# Patient Record
Sex: MALE | Race: WHITE | Employment: UNEMPLOYED | URBAN - METROPOLITAN AREA
[De-identification: names, ages, dates, MRNs, and addresses within clinical notes are randomized per-mention and may not be internally consistent; named-entity substitution may affect disease eponyms.]

---

## 2017-06-12 ENCOUNTER — HOSPITAL ENCOUNTER (EMERGENCY)
Facility: CLINIC | Age: 37
Discharge: HOME | End: 2017-06-12
Admitting: FAMILY MEDICINE
Payer: COMMERCIAL

## 2017-06-12 DIAGNOSIS — T15.02XA FOREIGN BODY OF LEFT CORNEA, INITIAL ENCOUNTER: ICD-10-CM

## 2017-06-12 PROCEDURE — 99284 EMERGENCY DEPT VISIT MOD MDM: CPT | Mod: Z6 | Performed by: FAMILY MEDICINE

## 2017-06-12 PROCEDURE — 99282 EMERGENCY DEPT VISIT SF MDM: CPT

## 2017-06-14 DIAGNOSIS — J30.2 SEASONAL ALLERGIC RHINITIS: ICD-10-CM

## 2017-06-14 RX ORDER — CETIRIZINE HYDROCHLORIDE 10 MG/1
10 TABLET ORAL DAILY
COMMUNITY
End: 2021-08-15

## 2017-06-14 RX ORDER — IBUPROFEN 800 MG/1
800 TABLET, FILM COATED ORAL ONCE
Status: ACTIVE | OUTPATIENT
Start: 2017-06-12

## 2017-06-14 RX ORDER — PROPARACAINE HYDROCHLORIDE 5 MG/ML
1 SOLUTION/ DROPS OPHTHALMIC ONCE
Status: ACTIVE | OUTPATIENT
Start: 2017-06-12

## 2017-06-14 NOTE — DOWNTIME EVENT NOTE
The EMR was down for 7 hours on 6/14/2017.    RI was responsible for completing the paper charting during this time period.     The following information was re-entered into the system by Jenn Resendiz: Height/weight, Home meds and MAR    The following information will remain in the paper chart: Remainder of  Chart. Unable to chart meds given, no CSN available, Ibuprofen given at 1250 at altecain given by MD. Jenn Resendiz  6/14/2017

## 2017-06-15 RX ORDER — LORATADINE 10 MG/1
TABLET ORAL
Qty: 90 TABLET | Refills: 1 | Status: SHIPPED | OUTPATIENT
Start: 2017-06-15 | End: 2021-08-15

## 2017-06-15 NOTE — TELEPHONE ENCOUNTER
Prescription approved per [facility] Refill Protocol.    Nikki Ty RN  [facility]

## 2017-06-15 NOTE — TELEPHONE ENCOUNTER
LORATADINE 10 MG TABLET        Last Written Prescription Date: 4/4/16  Last Fill Quantity: 90,  # refills: 2   Last Office Visit with [facility], [facility] or [facility] prescribing provider: 10/5/16

## 2017-07-20 ENCOUNTER — APPOINTMENT (OUTPATIENT)
Dept: CT IMAGING | Facility: CLINIC | Age: 37
End: 2017-07-20
Attending: EMERGENCY MEDICINE
Payer: COMMERCIAL

## 2017-07-20 ENCOUNTER — HOSPITAL ENCOUNTER (EMERGENCY)
Facility: CLINIC | Age: 37
Discharge: HOME | End: 2017-07-20
Attending: EMERGENCY MEDICINE | Admitting: EMERGENCY MEDICINE
Payer: COMMERCIAL

## 2017-07-20 VITALS
TEMPERATURE: 98.7 F | BODY MASS INDEX: 23.24 KG/M2 | WEIGHT: 166 LBS | OXYGEN SATURATION: 96 % | RESPIRATION RATE: 16 BRPM | HEART RATE: 86 BPM | SYSTOLIC BLOOD PRESSURE: 156 MMHG | HEIGHT: 71 IN | DIASTOLIC BLOOD PRESSURE: 95 MMHG
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
## 2017-07-20 DIAGNOSIS — R51.9 FACIAL PAIN: ICD-10-CM

## 2017-07-20 DIAGNOSIS — F07.81 POST CONCUSSIVE SYNDROME: ICD-10-CM

## 2017-07-20 DIAGNOSIS — S13.9XXA NECK SPRAIN, INITIAL ENCOUNTER: ICD-10-CM

## 2017-07-20 LAB
ALBUMIN SERPL-MCNC: 3.9 G/DL (ref 3.4–5)
ALP SERPL-CCNC: 58 U/L (ref 40–150)
ALT SERPL W P-5'-P-CCNC: 24 U/L (ref 0–70)
ANION GAP SERPL CALCULATED.3IONS-SCNC: 11 MMOL/L (ref 3–14)
AST SERPL W P-5'-P-CCNC: 10 U/L (ref 0–45)
BASOPHILS # BLD AUTO: 0 10E9/L (ref 0–0.2)
BASOPHILS NFR BLD AUTO: 0.1 %
BILIRUB SERPL-MCNC: 1.7 MG/DL (ref 0.2–1.3)
BUN SERPL-MCNC: 12 MG/DL (ref 7–30)
CALCIUM SERPL-MCNC: 8.9 MG/DL (ref 8.5–10.1)
CHLORIDE SERPL-SCNC: 103 MMOL/L (ref 94–109)
CO2 SERPL-SCNC: 28 MMOL/L (ref 20–32)
CREAT SERPL-MCNC: 0.74 MG/DL (ref 0.66–1.25)
DIFFERENTIAL METHOD BLD: NORMAL
EOSINOPHIL # BLD AUTO: 0 10E9/L (ref 0–0.7)
EOSINOPHIL NFR BLD AUTO: 0.1 %
ERYTHROCYTE [DISTWIDTH] IN BLOOD BY AUTOMATED COUNT: 12 % (ref 10–15)
GFR SERPL CREATININE-BSD FRML MDRD: ABNORMAL ML/MIN/1.7M2
GLUCOSE SERPL-MCNC: 93 MG/DL (ref 70–99)
HCT VFR BLD AUTO: 45.3 % (ref 40–53)
HGB BLD-MCNC: 15.8 G/DL (ref 13.3–17.7)
IMM GRANULOCYTES # BLD: 0 10E9/L (ref 0–0.4)
IMM GRANULOCYTES NFR BLD: 0.3 %
LIPASE SERPL-CCNC: 61 U/L (ref 73–393)
LYMPHOCYTES # BLD AUTO: 1 10E9/L (ref 0.8–5.3)
LYMPHOCYTES NFR BLD AUTO: 14.1 %
MCH RBC QN AUTO: 31.6 PG (ref 26.5–33)
MCHC RBC AUTO-ENTMCNC: 34.9 G/DL (ref 31.5–36.5)
MCV RBC AUTO: 91 FL (ref 78–100)
MONOCYTES # BLD AUTO: 1 10E9/L (ref 0–1.3)
MONOCYTES NFR BLD AUTO: 13.8 %
NEUTROPHILS # BLD AUTO: 5.1 10E9/L (ref 1.6–8.3)
NEUTROPHILS NFR BLD AUTO: 71.6 %
NRBC # BLD AUTO: 0 10*3/UL
NRBC BLD AUTO-RTO: 0 /100
PLATELET # BLD AUTO: 161 10E9/L (ref 150–450)
POTASSIUM SERPL-SCNC: 3.5 MMOL/L (ref 3.4–5.3)
PROT SERPL-MCNC: 7.1 G/DL (ref 6.8–8.8)
RBC # BLD AUTO: 5 10E12/L (ref 4.4–5.9)
SODIUM SERPL-SCNC: 142 MMOL/L (ref 133–144)
WBC # BLD AUTO: 7.1 10E9/L (ref 4–11)

## 2017-07-20 PROCEDURE — 96361 HYDRATE IV INFUSION ADD-ON: CPT | Mod: 59

## 2017-07-20 PROCEDURE — 83690 ASSAY OF LIPASE: CPT | Performed by: EMERGENCY MEDICINE

## 2017-07-20 PROCEDURE — 99284 EMERGENCY DEPT VISIT MOD MDM: CPT | Mod: Z6 | Performed by: EMERGENCY MEDICINE

## 2017-07-20 PROCEDURE — 99284 EMERGENCY DEPT VISIT MOD MDM: CPT | Mod: 25

## 2017-07-20 PROCEDURE — 25000132 ZZH RX MED GY IP 250 OP 250 PS 637: Performed by: EMERGENCY MEDICINE

## 2017-07-20 PROCEDURE — 85025 COMPLETE CBC W/AUTO DIFF WBC: CPT | Performed by: EMERGENCY MEDICINE

## 2017-07-20 PROCEDURE — 70450 CT HEAD/BRAIN W/O DYE: CPT

## 2017-07-20 PROCEDURE — 80053 COMPREHEN METABOLIC PANEL: CPT | Performed by: EMERGENCY MEDICINE

## 2017-07-20 PROCEDURE — 25000128 H RX IP 250 OP 636: Performed by: EMERGENCY MEDICINE

## 2017-07-20 PROCEDURE — 96374 THER/PROPH/DIAG INJ IV PUSH: CPT

## 2017-07-20 RX ORDER — LIDOCAINE 50 MG/G
1 PATCH TOPICAL ONCE
Status: COMPLETED | OUTPATIENT
Start: 2017-07-20 | End: 2017-07-20

## 2017-07-20 RX ORDER — ONDANSETRON 4 MG/1
4 TABLET, ORALLY DISINTEGRATING ORAL EVERY 8 HOURS PRN
Qty: 10 TABLET | Refills: 0 | Status: SHIPPED | OUTPATIENT
Start: 2017-07-20 | End: 2017-07-23

## 2017-07-20 RX ORDER — HYDROCODONE BITARTRATE AND ACETAMINOPHEN 5; 325 MG/1; MG/1
1-2 TABLET ORAL EVERY 4 HOURS PRN
Qty: 15 TABLET | Refills: 0 | Status: SHIPPED | OUTPATIENT
Start: 2017-07-20 | End: 2017-07-21

## 2017-07-20 RX ORDER — OXYCODONE HYDROCHLORIDE 5 MG/1
5 TABLET ORAL ONCE
Status: COMPLETED | OUTPATIENT
Start: 2017-07-20 | End: 2017-07-20

## 2017-07-20 RX ORDER — LIDOCAINE 50 MG/G
OINTMENT TOPICAL 2 TIMES DAILY PRN
Qty: 30 G | Refills: 0 | Status: SHIPPED | OUTPATIENT
Start: 2017-07-20 | End: 2017-07-21

## 2017-07-20 RX ORDER — ONDANSETRON 2 MG/ML
4 INJECTION INTRAMUSCULAR; INTRAVENOUS ONCE
Status: COMPLETED | OUTPATIENT
Start: 2017-07-20 | End: 2017-07-20

## 2017-07-20 RX ORDER — OXYCODONE AND ACETAMINOPHEN 5; 325 MG/1; MG/1
2 TABLET ORAL EVERY 6 HOURS PRN
COMMUNITY
End: 2017-07-20

## 2017-07-20 RX ORDER — LIDOCAINE 40 MG/G
CREAM TOPICAL
Status: DISCONTINUED | OUTPATIENT
Start: 2017-07-20 | End: 2017-07-20 | Stop reason: HOSPADM

## 2017-07-20 RX ADMIN — SODIUM CHLORIDE, POTASSIUM CHLORIDE, SODIUM LACTATE AND CALCIUM CHLORIDE 1000 ML: 600; 310; 30; 20 INJECTION, SOLUTION INTRAVENOUS at 11:23

## 2017-07-20 RX ADMIN — LIDOCAINE 1 PATCH: 50 PATCH CUTANEOUS at 11:21

## 2017-07-20 RX ADMIN — OXYCODONE HYDROCHLORIDE 5 MG: 5 TABLET ORAL at 11:28

## 2017-07-20 RX ADMIN — ONDANSETRON 4 MG: 2 INJECTION INTRAMUSCULAR; INTRAVENOUS at 11:23

## 2017-07-20 RX ADMIN — LIDOCAINE 1 PATCH: 50 PATCH CUTANEOUS at 13:56

## 2017-07-20 NOTE — ED AVS SNAPSHOT
[facility], Emergency Department    [address]    [place] [address]    Phone:  [phone]    Fax:  [phone]                                       Porfirio Benz   MRN: [id number]    Department:  [facility], Emergency Department   Date of Visit:  7/20/2017           Patient Information     Date Of Birth          1980        Your diagnoses for this visit were:     Post concussive syndrome     Facial pain     Neck sprain, initial encounter        You were seen by Ana Real MD.        Discharge Instructions       Thank you for coming to the [facility] Emergency Department.     Please follow up with your primary care clinic in the next week.     Keep your scheduled appointment with ENT about your facial fractures and the [facility].     Use zofran for nausea.   If pain is severe, use Norco (hydrocodone-acetaminophen). Try Tylenol 1000mg every 4 hours for moderate pain. Please note that Norco contains acetaminophen (Tylenol).  Do not take more than 4000mg of acetaminophen from all sources in a 24 hour period.  Apply warm packs to sore areas.   Keep the lidocaine patch on for 12 hours, then remove it. After 12 hours without the patch, start applying lidocaine cream to painful areas of your neck and shoulders.       24 Hour Appointment Hotline       To make an appointment at any [facility] clinic, call [phone] ([phone]). If you don't have a family doctor or clinic, we will help you find one. [facility] clinics are conveniently located to serve the needs of you and your family.             Review of your medicines      START taking        Dose / Directions Last dose taken    HYDROcodone-acetaminophen 5-325 MG per tablet   Commonly known as:  NORCO   Dose:  1-2 tablet   Quantity:  15 tablet        Take 1-2 tablets by mouth every 4 hours as needed for moderate to severe pain   Refills:  0        lidocaine 5 %  ointment   Commonly known as:  XYLOCAINE   Quantity:  30 g        Apply topically 2 times daily as needed for moderate pain To neck and shoulders   Refills:  0        MAGIC MOUTHWASH (ENTER INGREDIENTS IN COMMENTS)   Dose:  5 mL   Quantity:  180 mL        Swish and spit 5 mLs in mouth every 6 hours as needed   Refills:  0        ondansetron 4 MG ODT tab   Commonly known as:  ZOFRAN ODT   Dose:  4 mg   Quantity:  10 tablet        Take 1 tablet (4 mg) by mouth every 8 hours as needed for nausea   Refills:  0          Our records show that you are taking the medicines listed below. If these are incorrect, please call your family doctor or clinic.        Dose / Directions Last dose taken    cetirizine 10 MG tablet   Commonly known as:  zyrTEC   Dose:  10 mg        Take 10 mg by mouth daily   Refills:  0        gabapentin 100 MG capsule   Commonly known as:  NEURONTIN   Dose:  100 mg   Quantity:  90 capsule        Take 1 capsule (100 mg) by mouth 3 times daily   Refills:  1        loratadine 10 MG tablet   Commonly known as:  CLARITIN   Quantity:  90 tablet        TAKE ONE TABLET BY MOUTH ONE TIME DAILY   Refills:  1          STOP taking        Dose Reason for stopping Comments    oxyCODONE-acetaminophen 5-325 MG per tablet   Commonly known as:  PERCOCET                      Prescriptions were sent or printed at these locations (4 Prescriptions)                   Other Prescriptions                Printed at Department/Unit printer (4 of 4)         ondansetron (ZOFRAN ODT) 4 MG ODT tab               HYDROcodone-acetaminophen (NORCO) 5-325 MG per tablet               lidocaine (XYLOCAINE) 5 % ointment               MAGIC MOUTHWASH, ENTER INGREDIENTS IN COMMENTS,                Procedures and tests performed during your visit     CBC with platelets differential    Comprehensive metabolic panel    Head CT w/o contrast    Lipase    Peripheral IV catheter      Orders Needing Specimen Collection     None      Pending Results      No orders found from 7/18/2017 to 7/21/2017.            Pending Culture Results     No orders found from 7/18/2017 to 7/21/2017.            Pending Results Instructions     If you had any lab results that were not finalized at the time of your Discharge, you can call the ED Lab Result RN at [phone]. You will be contacted by this team for any positive Lab results or changes in treatment. The nurses are available 7 days a week from 10A to 6:30P.  You can leave a message 24 hours per day and they will return your call.        Thank you for choosing [facility]       Thank you for choosing [facility] for your care. Our goal is always to provide you with excellent care. Hearing back from our patients is one way we can continue to improve our services. Please take a few minutes to complete the written survey that you may receive in the mail after you visit with us. Thank you!        [facility] Information     [employer] gives you secure access to your electronic health record. If you see a primary care provider, you can also send messages to your care team and make appointments. If you have questions, please call your primary care clinic.  If you do not have a primary care provider, please call [phone] and they will assist you.        Care EveryWhere ID     This is your Care EveryWhere ID. This could be used by other organizations to access your [facility] medical records  [id number]        Equal Access to Services     NICK JAY : Amanda Farnsworth, wasandrineda luqadaha, qaybta kaalmeet malone, eric parikh. So [facility] [phone].    ATENCIÓN: Si habla español, tiene a coles disposición servicios gratuitos de asistencia lingüística. Llame al [phone].    We comply with applicable federal civil rights laws and [place] laws. We do not discriminate on the basis of race, color, national origin, age, disability sex, sexual orientation or gender identity.            After Visit  Summary       This is your record. Keep this with you and show to your community pharmacist(s) and doctor(s) at your next visit.

## 2017-07-20 NOTE — ED AVS SNAPSHOT
[facility], Emergency Department    [address]    [facility] [address]    Phone:  [phone]    Fax:  [phone]                                       Porfirio Benz   MRN: [id number]    Department:  [facility], Emergency Department   Date of Visit:  7/20/2017           After Visit Summary Signature Page     I have received my discharge instructions, and my questions have been answered. I have discussed any challenges I see with this plan with the nurse or doctor.    ..........................................................................................................................................  Patient/Patient Representative Signature      ..........................................................................................................................................  Patient Representative Print Name and Relationship to Patient    ..................................................               ................................................  Date                                            Time    ..........................................................................................................................................  Reviewed by Signature/Title    ...................................................              ..............................................  Date                                                            Time

## 2017-07-20 NOTE — DISCHARGE INSTRUCTIONS
Thank you for coming to the [facility] Emergency Department.     Please follow up with your primary care clinic in the next week.     Keep your scheduled appointment with ENT about your facial fractures and the [facility].     Use zofran for nausea.   If pain is severe, use Norco (hydrocodone-acetaminophen). Try Tylenol 1000mg every 4 hours for moderate pain. Please note that Norco contains acetaminophen (Tylenol).  Do not take more than 4000mg of acetaminophen from all sources in a 24 hour period.  Apply warm packs to sore areas.   Keep the lidocaine patch on for 12 hours, then remove it. After 12 hours without the patch, start applying lidocaine cream to painful areas of your neck and shoulders.
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
## 2017-07-21 ENCOUNTER — OFFICE VISIT (OUTPATIENT)
Dept: FAMILY MEDICINE | Facility: CLINIC | Age: 37
End: 2017-07-21
Payer: COMMERCIAL

## 2017-07-21 ENCOUNTER — TELEPHONE (OUTPATIENT)
Dept: FAMILY MEDICINE | Facility: CLINIC | Age: 37
End: 2017-07-21

## 2017-07-21 VITALS
HEIGHT: 71 IN | SYSTOLIC BLOOD PRESSURE: 131 MMHG | HEART RATE: 62 BPM | OXYGEN SATURATION: 98 % | TEMPERATURE: 98 F | DIASTOLIC BLOOD PRESSURE: 88 MMHG

## 2017-07-21 DIAGNOSIS — M54.2 CERVICALGIA: ICD-10-CM

## 2017-07-21 DIAGNOSIS — Z87.828 HISTORY OF MOTOR VEHICLE ACCIDENT: Primary | ICD-10-CM

## 2017-07-21 DIAGNOSIS — S06.0X9D CONCUSSION, WITH LOC OF UNSPECIFIED DURATION, SUBSEQUENT ENCOUNTER: ICD-10-CM

## 2017-07-21 DIAGNOSIS — M54.6 ACUTE THORACIC BACK PAIN, UNSPECIFIED BACK PAIN LATERALITY: ICD-10-CM

## 2017-07-21 PROCEDURE — 99214 OFFICE O/P EST MOD 30 MIN: CPT | Performed by: FAMILY MEDICINE

## 2017-07-21 RX ORDER — ONDANSETRON 4 MG/1
4 TABLET, FILM COATED ORAL EVERY 8 HOURS PRN
Qty: 24 TABLET | Refills: 0 | Status: SHIPPED | OUTPATIENT
Start: 2017-07-21 | End: 2021-08-15

## 2017-07-21 RX ORDER — HYDROCODONE BITARTRATE AND ACETAMINOPHEN 5; 325 MG/1; MG/1
1 TABLET ORAL EVERY 4 HOURS PRN
Qty: 25 TABLET | Refills: 0 | Status: SHIPPED | OUTPATIENT
Start: 2017-07-21 | End: 2021-08-15

## 2017-07-21 RX ORDER — CYCLOBENZAPRINE HCL 10 MG
10 TABLET ORAL 3 TIMES DAILY
Qty: 42 TABLET | Refills: 0 | Status: SHIPPED | OUTPATIENT
Start: 2017-07-21 | End: 2017-08-04

## 2017-07-21 RX ORDER — LIDOCAINE 50 MG/G
OINTMENT TOPICAL 2 TIMES DAILY PRN
Qty: 250 G | Refills: 0 | Status: SHIPPED | OUTPATIENT
Start: 2017-07-21 | End: 2021-08-15

## 2017-07-21 ASSESSMENT — ENCOUNTER SYMPTOMS
HEADACHES: 1
PHOTOPHOBIA: 1
FACIAL SWELLING: 1
NAUSEA: 1
NECK PAIN: 1
FEVER: 0
RESPIRATORY NEGATIVE: 1
CHILLS: 0
VOMITING: 1
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
## 2017-07-21 NOTE — PATIENT INSTRUCTIONS
Keep ent apt   See concussion   zofran as needed for nausea  Push fluids  Refilled norco but decrease over time to non as can cause rebound headaches and make concussion worse  Increase ibuporfen to 600 mg every 6 hrs with food  Try to move around   Refilled lidocaine   Continue with being active  and modify activity that causes more pain   Ice then heat 20 min twice a day  Over the counter pain creams to area  Flexeril 10 mg three times a day # 42 no refills  Can make you tired, do not drive or operate machinery on this medication   Follow up with primary if symptoms persist  If get worse such that loose control of bladder , bowels or difficulty walking go to Urgent care/ ER
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
## 2017-07-21 NOTE — TELEPHONE ENCOUNTER
Spoke with wife gertrudis for 115p today, however pt insurance is [employer], called them back and let them know we do not take [employer] insurance,   is he using his [employer] insurance if so then we can see him    She will be calling back to clarify the insurance they plan on using    John Hadley RN

## 2017-07-21 NOTE — TELEPHONE ENCOUNTER
Call to patient after huddle with provider patel, patient was booked as a double book at [facility]. Patient was rescheduled to a open appointment [facility] at 1420      Nikki Ty RN  [facility]
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
## 2017-07-24 ENCOUNTER — TELEPHONE (OUTPATIENT)
Dept: FAMILY MEDICINE | Facility: CLINIC | Age: 37
End: 2017-07-24

## 2017-07-24 NOTE — TELEPHONE ENCOUNTER
----- Message from Allen Gallego ATC sent at 7/24/2017  1:00 PM CDT -----  Regarding: Concussion  Referral  Dr Arauz.    I Have contacted Porfirio, and he informed me that he is scheduled for a concussion appointment on Aug 2nd. He is unaware of who it is with as his wife made the appointment. As far as I can tell, it is not with [facility] or the [facility]. I informed him that if he feels he needs to seek further f/u, to contact the  or the [place] clinic. I would be able to get him an appointment at the [facility]'s concussion clinic the second week in August if needed.    Allen Gallego, LILIAN  [facility]   [phone]

## 2018-02-07 ENCOUNTER — TRANSFERRED RECORDS (OUTPATIENT)
Dept: HEALTH INFORMATION MANAGEMENT | Facility: CLINIC | Age: 38
End: 2018-02-07

## 2018-02-14 ENCOUNTER — TRANSFERRED RECORDS (OUTPATIENT)
Dept: HEALTH INFORMATION MANAGEMENT | Facility: CLINIC | Age: 38
End: 2018-02-14

## 2018-02-21 ENCOUNTER — TRANSFERRED RECORDS (OUTPATIENT)
Dept: HEALTH INFORMATION MANAGEMENT | Facility: CLINIC | Age: 38
End: 2018-02-21

## 2018-03-07 ENCOUNTER — TRANSFERRED RECORDS (OUTPATIENT)
Dept: HEALTH INFORMATION MANAGEMENT | Facility: CLINIC | Age: 38
End: 2018-03-07

## 2019-04-07 ENCOUNTER — HOSPITAL ENCOUNTER (EMERGENCY)
Facility: CLINIC | Age: 39
Discharge: HOME | End: 2019-04-07
Attending: EMERGENCY MEDICINE | Admitting: EMERGENCY MEDICINE
Payer: COMMERCIAL

## 2019-04-07 VITALS
OXYGEN SATURATION: 99 % | TEMPERATURE: 96.5 F | RESPIRATION RATE: 18 BRPM | DIASTOLIC BLOOD PRESSURE: 72 MMHG | BODY MASS INDEX: 26.5 KG/M2 | WEIGHT: 190 LBS | SYSTOLIC BLOOD PRESSURE: 115 MMHG

## 2019-04-07 DIAGNOSIS — S99.921A INJURY OF TOE, RIGHT, INITIAL ENCOUNTER: ICD-10-CM

## 2019-04-07 PROCEDURE — 25000132 ZZH RX MED GY IP 250 OP 250 PS 637: Performed by: EMERGENCY MEDICINE

## 2019-04-07 PROCEDURE — 99283 EMERGENCY DEPT VISIT LOW MDM: CPT | Performed by: EMERGENCY MEDICINE

## 2019-04-07 PROCEDURE — 99283 EMERGENCY DEPT VISIT LOW MDM: CPT | Mod: Z6 | Performed by: EMERGENCY MEDICINE

## 2019-04-07 RX ORDER — IBUPROFEN 600 MG/1
600 TABLET, FILM COATED ORAL ONCE
Status: COMPLETED | OUTPATIENT
Start: 2019-04-07 | End: 2019-04-07

## 2019-04-07 RX ADMIN — IBUPROFEN 600 MG: 600 TABLET ORAL at 18:37

## 2019-04-07 ASSESSMENT — ENCOUNTER SYMPTOMS
NUMBNESS: 0
WEAKNESS: 0
WOUND: 0

## 2019-04-07 NOTE — ED PROVIDER NOTES
"  History     Chief Complaint   Patient presents with     Toe Injury     Pt hit 4th and 5th toe on washing machine. C/O pain in those toes     The history is provided by the patient and medical records.     Porfirio Benz is a 38 year old male who who presents to the Emergency Department for evaluation of toe injury. Patient reports that he was chasing his 4 year old in the basement when he accidentally jammed his 4th and 5th toe of his right foot on the washing machine. After examining his 5th toe, noticed that he was able to bend it outward with minimal resistance. Wife insisted that he visit the ED. He has not taken pain medication.     Past Medical History:   Diagnosis Date     Asthma, mild intermittent     Ptr stated,\"I don't think I have Asthma, It from smoking\"     Bipolar 1 disorder (H)     dx in [facility], [place]-another doc rec        Past Surgical History:   Procedure Laterality Date     ORTHOPEDIC SURGERY      Ankle surgery to years ago.       Family History   Problem Relation Age of Onset     Diabetes Mother      Cancer Maternal Grandfather      Alzheimer Disease Paternal Grandmother        Social History     Tobacco Use     Smoking status: Former Smoker     Packs/day: 0.50     Types: Cigarettes     Last attempt to quit: 2017     Years since quittin.7     Smokeless tobacco: Never Used     Tobacco comment: Have not smoked in Three weeks. Thinking of quiting\"   Substance Use Topics     Alcohol use: Yes     Alcohol/week: 0.0 oz     Comment: \"Once in a great While\"       Current Facility-Administered Medications   Medication     ibuprofen (ADVIL/MOTRIN) tablet 600 mg     Current Outpatient Medications   Medication     cetirizine (ZYRTEC) 10 MG tablet     gabapentin (NEURONTIN) 100 MG capsule     HYDROcodone-acetaminophen (NORCO) 5-325 MG per tablet     lidocaine (XYLOCAINE) 5 % ointment     loratadine (CLARITIN) 10 MG tablet     MAGIC MOUTHWASH, ENTER INGREDIENTS IN COMMENTS, "     ondansetron (ZOFRAN) 4 MG tablet     Facility-Administered Medications Ordered in Other Encounters   Medication     ibuprofen (ADVIL/MOTRIN) tablet 800 mg     proparacaine (ALCAINE) 0.5 % ophthalmic solution 1 drop        Allergies   Allergen Reactions     Seasonal Allergies        I have reviewed the Medications, Allergies, Past Medical and Surgical History, and Social History in the Epic system.    Review of Systems   Musculoskeletal:        Positive for injury of 4th and 5th toe of right foot   Skin: Negative for wound.   Neurological: Negative for weakness and numbness.       Physical Exam   BP: 115/72  Heart Rate: 69  Temp: 96.5  F (35.8  C)  Resp: 18  Weight: 86.2 kg (190 lb)  SpO2: 99 %      Physical Exam   Constitutional: He is oriented to person, place, and time. He appears well-developed and well-nourished. No distress.   Musculoskeletal:   Swelling and pain with manipulation of the fifth toe on right foot.  No obvious deformity.   Neurological: He is alert and oriented to person, place, and time.   Sensation intact to distal fourth and fifth toe of right foot   Skin: Skin is warm and dry.       ED Course   6:25 PM  The patient was seen and examined by Gladis Sethi MD in Room ED20.        Procedures             Critical Care time:  none             Labs Ordered and Resulted from Time of ED Arrival Up to the Time of Departure from the ED - No data to display         Assessments & Plan (with Medical Decision Making)     Mr. Benz is a 38-year-old man who presented today after injuring his right foot on a dryer.  He has no other injuries.  His feet are affected neurovascularly intact with positive DP pulse and normal cap refill to the foot, no numbness to toe.  He does have swelling and tenderness to his right fifth toe.  Also has some mild tenderness to his fourth toe.  Will obtain x-ray, likely placed in orthopedic shoe with demetris taping.  Requested ibuprofen which will be provided.    Progress  note:  Patient abruptly walked out of the ED saying that he needed to leave.  Did not explain or allow us to give him and orthopedic shoe. Did receive ibuprofen.  Did not have xray.  Gladis Sethi MD on 4/7/2019 at 7:27 PM     I have reviewed the nursing notes.    I have reviewed the findings, diagnosis, plan and need for follow up with the patient.       Medication List      There are no discharge medications for this visit.         Final diagnoses:   None     I, Keiry Oscar, am serving as a trained medical scribe to document services personally performed by Gladis Sethi MD, based on the provider's statements to me.      IGladis MD, was physically present and have reviewed and verified the accuracy of this note documented by Keiry Oscar    4/7/2019   [facility], [place], EMERGENCY DEPARTMENT     Gladis Sethi MD  04/07/19 1928

## 2019-04-08 NOTE — ED NOTES
Pt was seen by MD, x-rays ordered, and Pt given Ibuprofen.  Per primary RN, Pt did not want to stay.

## 2019-04-08 NOTE — ED NOTES
Patient requesting to leave, MD seen patient discussed risks and consequences of not finishing treatment. AMA form signed by the patient.

## 2019-09-28 ENCOUNTER — HEALTH MAINTENANCE LETTER (OUTPATIENT)
Age: 39
End: 2019-09-28

## 2020-12-19 ENCOUNTER — HOSPITAL ENCOUNTER (EMERGENCY)
Facility: CLINIC | Age: 40
Discharge: HOME | End: 2020-12-19
Attending: STUDENT IN AN ORGANIZED HEALTH CARE EDUCATION/TRAINING PROGRAM | Admitting: STUDENT IN AN ORGANIZED HEALTH CARE EDUCATION/TRAINING PROGRAM
Payer: COMMERCIAL

## 2020-12-19 VITALS
SYSTOLIC BLOOD PRESSURE: 110 MMHG | DIASTOLIC BLOOD PRESSURE: 65 MMHG | HEART RATE: 78 BPM | TEMPERATURE: 97.5 F | OXYGEN SATURATION: 99 %

## 2020-12-19 DIAGNOSIS — S61.219A LACERATION OF FINGER OF RIGHT HAND, FOREIGN BODY PRESENCE UNSPECIFIED, NAIL DAMAGE STATUS UNSPECIFIED, UNSPECIFIED FINGER, INITIAL ENCOUNTER: ICD-10-CM

## 2020-12-19 PROCEDURE — 99283 EMERGENCY DEPT VISIT LOW MDM: CPT | Mod: 25 | Performed by: STUDENT IN AN ORGANIZED HEALTH CARE EDUCATION/TRAINING PROGRAM

## 2020-12-19 PROCEDURE — 12002 RPR S/N/AX/GEN/TRNK2.6-7.5CM: CPT | Performed by: STUDENT IN AN ORGANIZED HEALTH CARE EDUCATION/TRAINING PROGRAM

## 2020-12-19 PROCEDURE — 250N000009 HC RX 250

## 2020-12-19 RX ORDER — LIDOCAINE HYDROCHLORIDE AND EPINEPHRINE 10; 10 MG/ML; UG/ML
INJECTION, SOLUTION INFILTRATION; PERINEURAL
Status: COMPLETED
Start: 2020-12-19 | End: 2020-12-19

## 2020-12-19 RX ORDER — LIDOCAINE HYDROCHLORIDE AND EPINEPHRINE 10; 10 MG/ML; UG/ML
10 INJECTION, SOLUTION INFILTRATION; PERINEURAL ONCE
Status: COMPLETED | OUTPATIENT
Start: 2020-12-19 | End: 2020-12-19

## 2020-12-19 RX ADMIN — LIDOCAINE HYDROCHLORIDE AND EPINEPHRINE 9 ML: 10; 10 INJECTION, SOLUTION INFILTRATION; PERINEURAL at 11:21

## 2020-12-19 NOTE — ED AVS SNAPSHOT
[facility] Emergency Department  [address]  [place] [address]  Phone: [phone]  Fax: [phone]                                    Porfirio Benz   MRN: [id number]    Department: [facility] Emergency Department   Date of Visit: 12/19/2020           After Visit Summary Signature Page    I have received my discharge instructions, and my questions have been answered. I have discussed any challenges I see with this plan with the nurse or doctor.    ..........................................................................................................................................  Patient/Patient Representative Signature      ..........................................................................................................................................  Patient Representative Print Name and Relationship to Patient    ..................................................               ................................................  Date                                   Time    ..........................................................................................................................................  Reviewed by Signature/Title    ...................................................              ..............................................  Date                                               Time          22EPIC Rev 08/18

## 2020-12-19 NOTE — ED PROVIDER NOTES
"ED Provider Note    History     Chief Complaint   Patient presents with     Hand Injury     HPI  Porfirio Benz is a 40 year old male with PMH notable for none who presents to the ED with hand laceration.     Patient was working with a  this morning for his job and slipped.  He was lacerated on the dorsum of his right hand on the second and third digit at the base.  He has no paresthesias or loss of sensation.  He has normal range of motion of his fingers.  He did not injure any other part of his body.  He says the pain is just at the area of laceration does not extend further.  He does not have a history of IV drug use or other high risk features for infection.    Injury was just prior to arrival.  He has not tried anything to help with his pain.  Unclear about his tetanus status at this time.    It seems that the injury was clean.  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    Past Medical History  Past Medical History:   Diagnosis Date     Asthma, mild intermittent     Ptr stated,\"I don't think I have Asthma, It from smoking\"     Bipolar 1 disorder (H) 2010    dx in [facility]-another doc rec      Past Surgical History:   Procedure Laterality Date     ORTHOPEDIC SURGERY      Ankle surgery to years ago.          cetirizine (ZYRTEC) 10 MG tablet       gabapentin (NEURONTIN) 100 MG capsule       HYDROcodone-acetaminophen (NORCO) 5-325 MG per tablet       lidocaine (XYLOCAINE) 5 % ointment       loratadine (CLARITIN) 10 MG tablet       MAGIC MOUTHWASH, ENTER INGREDIENTS IN COMMENTS,       ondansetron (ZOFRAN) 4 MG tablet      Allergies   Allergen Reactions     Seasonal Allergies      Past medical history, past surgical history, medications, and allergies were reviewed with the patient. Additional pertinent items: None    Family History  Family History   Problem Relation Age of Onset     Diabetes Mother      Cancer Maternal Grandfather      Alzheimer Disease Paternal Grandmother  " "    Family history was reviewed with the patient. Additional pertinent items: None    Social History  Social History     Tobacco Use     Smoking status: Former Smoker     Packs/day: 0.50     Types: Cigarettes     Quit date: 7/13/2017     Years since quitting: 3.4     Smokeless tobacco: Never Used     Tobacco comment: Have not smoked in Three weeks. Thinking of quiting\"   Substance Use Topics     Alcohol use: Yes     Alcohol/week: 0.0 standard drinks     Comment: \"Once in a great While\"     Drug use: No      Social history was reviewed with the patient. Additional pertinent items: None    Review of Systems  A complete review of systems was performed with pertinent positives and negatives noted in the HPI, and all other systems negative.     Physical Exam   BP: 106/68  Pulse: 82  Temp: 97.1  F (36.2  C)    Physical Exam  General: no acute distress. Appears stated age.   HENT: MMM, no oropharyngeal lesions  Eyes: PERRL, normal sclerae  Neck: non-tender, supple  Cardio: reg rate. Regular rhythm. Extremities well perfused  Resp: Normal work of breathing, clear breath sounds  Chest/Back: no visual signs of trauma, no CVA tenderness  Abdomen: no tenderness, non-distended, no rebound, no guarding  Neuro: alert and fully oriented. CN II-XII grossly intact. Grossly normal strength and sensation in all extremities.   MSK: no deformities.  3 cm laceration over the base of the right second digit dorsal side.  2 cm laceration at the base of the third digit right hand dorsal side.  Lacerations are linear and clean.  They were explored thoroughly and have no evidence of underlying tendon or bone involvement.  Integumentary/Skin: no rash visualized, normal color  Psych: normal affect, normal behavior    ED Course      [facility]     -Laceration Repair    Date/Time: 12/19/2020 11:59 AM  Performed by: Michael Oscar MD  Authorized by: Michael Oscar MD       ANESTHESIA (see MAR for exact " dosages):     Anesthesia method:  Local infiltration    Local anesthetic:  Lidocaine 1% WITH epi  LACERATION DETAILS     Location:  Finger    Finger location:  R index finger    Length (cm):  3    Depth (mm):  2    REPAIR TYPE:     Repair type:  Simple      EXPLORATION:     Hemostasis achieved with:  Direct pressure    Wound exploration: wound explored through full range of motion      Contaminated: no      TREATMENT:     Area cleansed with:  Saline    Amount of cleaning:  Standard    Irrigation solution:  Sterile saline    SKIN REPAIR     Repair method:  Sutures    Suture size:  4-0    Suture material:  Nylon    Suture technique:  Simple interrupted    Number of sutures:  7    APPROXIMATION     Approximation:  Close    POST-PROCEDURE DETAILS     Dressing:  Antibiotic ointment and adhesive bandage      PROCEDURE Describe Procedure: Dermabond over the third digit laceration               Labs Ordered and Resulted from Time of ED Arrival Up to the Time of Departure from the ED - No data to display  No orders to display          Assessments & Plan (with Medical Decision Making)     Porfirio Benz is a 40 year old male with no significant past medical history who presents with 2 dorsal right hand lacerations from a  today his Tdap was updated in 2015 and this is a clean wound so we did not reupdate his tetanus.  He was anesthetized with lido w/ epi and repaired as detailed in procedure note. No other injuries, discharged with sutures to be out in 10 days with PCP or back here.     Impression:  1. Right finger laceration    Michael Oscar MD  Emergency Medicine     Michael Oscar MD  12/19/20 1200

## 2020-12-19 NOTE — ED TRIAGE NOTES
Pt has a 2cm lac to the base of the second digit and a 1cm lac to the 3rd digit of the right hand with a  knife.  Pt has FROM/CMS intact.  Bleeding controlled. Incident happened just PTA.

## 2021-01-10 ENCOUNTER — HEALTH MAINTENANCE LETTER (OUTPATIENT)
Age: 41
End: 2021-01-10

## 2021-08-15 ENCOUNTER — HOSPITAL ENCOUNTER (EMERGENCY)
Facility: CLINIC | Age: 41
Discharge: HOME | End: 2021-08-15
Attending: EMERGENCY MEDICINE | Admitting: EMERGENCY MEDICINE
Payer: COMMERCIAL

## 2021-08-15 VITALS
HEART RATE: 60 BPM | BODY MASS INDEX: 23.12 KG/M2 | TEMPERATURE: 98.2 F | DIASTOLIC BLOOD PRESSURE: 71 MMHG | RESPIRATION RATE: 16 BRPM | SYSTOLIC BLOOD PRESSURE: 125 MMHG | OXYGEN SATURATION: 100 % | WEIGHT: 165.8 LBS

## 2021-08-15 DIAGNOSIS — S61.211A LACERATION OF LEFT INDEX FINGER WITHOUT FOREIGN BODY WITHOUT DAMAGE TO NAIL, INITIAL ENCOUNTER: ICD-10-CM

## 2021-08-15 PROCEDURE — 250N000011 HC RX IP 250 OP 636: Performed by: EMERGENCY MEDICINE

## 2021-08-15 PROCEDURE — 99282 EMERGENCY DEPT VISIT SF MDM: CPT | Performed by: EMERGENCY MEDICINE

## 2021-08-15 PROCEDURE — 90471 IMMUNIZATION ADMIN: CPT | Performed by: EMERGENCY MEDICINE

## 2021-08-15 PROCEDURE — 90714 TD VACC NO PRESV 7 YRS+ IM: CPT | Performed by: EMERGENCY MEDICINE

## 2021-08-15 PROCEDURE — 99282 EMERGENCY DEPT VISIT SF MDM: CPT | Mod: 25 | Performed by: EMERGENCY MEDICINE

## 2021-08-15 RX ADMIN — CLOSTRIDIUM TETANI TOXOID ANTIGEN (FORMALDEHYDE INACTIVATED) AND CORYNEBACTERIUM DIPHTHERIAE TOXOID ANTIGEN (FORMALDEHYDE INACTIVATED) 0.5 ML: 5; 2 INJECTION, SUSPENSION INTRAMUSCULAR at 09:59

## 2021-08-15 ASSESSMENT — ENCOUNTER SYMPTOMS
NECK STIFFNESS: 0
ABDOMINAL PAIN: 0
SHORTNESS OF BREATH: 0
COLOR CHANGE: 0
EYE REDNESS: 0
WOUND: 1
DIFFICULTY URINATING: 0
HEADACHES: 0
FEVER: 0
CONFUSION: 0
ARTHRALGIAS: 0

## 2021-08-15 NOTE — ED NOTES
Patient's finger wrapped with surgi-seal, Vaseline gauze, tube gauze. Patient bled through initial wrapping. Pressure was applied and extremity elevated. Bleeding stopped and finger was re-wrapped.

## 2021-08-15 NOTE — ED PROVIDER NOTES
"    [facility] [place] ([facility])       8/15/21  History     Chief Complaint   Patient presents with     Laceration     L index finger     The history is provided by the patient and medical records.   Laceration  Associated symptoms: no fever      Porfirio Benz is a right handed 40 year old male who presents to the Emergency Department for evaluation of a large laceration to his left index finger. He was attempting to turn a glass doorknob in his bathroom this morning when it broke off into his hand about one hour prior to arrival. He endorses pain to the area but denies numbness or tingling. Patient's last tetanus shot was in .     Past Medical History:   Diagnosis Date     Asthma, mild intermittent     Ptr stated,\"I don't think I have Asthma, It from smoking\"     Bipolar 1 disorder (H)     dx in [facility], [place]-another doc rec      PTSD (post-traumatic stress disorder)        Past Surgical History:   Procedure Laterality Date     ORTHOPEDIC SURGERY      Ankle surgery to years ago.       Family History   Problem Relation Age of Onset     Diabetes Mother      Cancer Maternal Grandfather      Alzheimer Disease Paternal Grandmother        Social History     Tobacco Use     Smoking status: Former Smoker     Packs/day: 0.50     Types: Cigarettes     Quit date: 2017     Years since quittin.0     Smokeless tobacco: Never Used     Tobacco comment: Have not smoked in Three weeks. Thinking of quiting\"   Substance Use Topics     Alcohol use: Not Currently     Alcohol/week: 0.0 standard drinks     Comment: \"Once in a great While\"       No current facility-administered medications for this encounter.     Current Outpatient Medications   Medication     medical cannabis (Patient's own supply)     Facility-Administered Medications Ordered in Other Encounters   Medication     ibuprofen (ADVIL/MOTRIN) tablet 800 mg     proparacaine (ALCAINE) 0.5 % ophthalmic solution 1 drop "        Allergies   Allergen Reactions     Seasonal Allergies          I have reviewed the Medications, Allergies, Past Medical and Surgical History, and Social History in the Epic system.    Review of Systems   Constitutional: Negative for fever.   HENT: Negative for congestion.    Eyes: Negative for redness.   Respiratory: Negative for shortness of breath.    Cardiovascular: Negative for chest pain.   Gastrointestinal: Negative for abdominal pain.   Genitourinary: Negative for difficulty urinating.   Musculoskeletal: Negative for arthralgias and neck stiffness.   Skin: Positive for wound (L index finger). Negative for color change.   Neurological: Negative for headaches.   Psychiatric/Behavioral: Negative for confusion.   All other systems reviewed and are negative.    A complete review of systems was performed with pertinent positives and negatives noted in the HPI, and all other systems negative.    Physical Exam   BP: 132/79  Pulse: 53  Temp: 98.2  F (36.8  C)  Resp: 18  Weight: 75.2 kg (165 lb 12.8 oz)  SpO2: 100 %      Physical Exam  Constitutional:       General: He is not in acute distress.     Appearance: He is well-developed. He is not diaphoretic.   HENT:      Head: Normocephalic and atraumatic.   Eyes:      General: No scleral icterus.  Musculoskeletal:        Hands:       Cervical back: Normal range of motion and neck supple.   Skin:     General: Skin is warm and dry.      Findings: No rash.   Neurological:      Mental Status: He is alert and oriented to person, place, and time.         ED Course     At 9:44 AM the patient was seen and examined by Guanako Luna MD in Room ED04.        Procedures            The medical record was reviewed and interpreted.         No results found for this or any previous visit (from the past 24 hour(s)).  Medications   Td (tetanus & diphtheria toxoids) -  adult formulation - for ages 7 years and older (0.5 mLs Intramuscular Given 8/15/21 0959)             Assessments &  Plan (with Medical Decision Making)   40-year-old male who presents for evaluation of left index finger injury.  Differential included fracture, dislocation, laceration, abrasion, sprain, contusion.  Examination revealed avulsion type laceration measuring 1 x 2 cm.  Bleeding was controlled with direct pressure and tube gauze was applied after thorough cleaning had occurred.  Patient was given tetanus toxoid.  We discussed complications such as infection which would prompt further medical evaluation.  Patient will leave tube gauze on for 5 days and use Tylenol and/or ibuprofen for discomfort.    I have reviewed the nursing notes.    I have reviewed the findings, diagnosis, plan and need for follow up with the patient.    New Prescriptions    No medications on file       Final diagnoses:   Laceration of left index finger without foreign body without damage to nail, initial encounter       INikki am serving as a trained medical scribe to document services personally performed by Guanako Luna MD, based on the provider's statements to me.      IGuanako MD, was physically present and have reviewed and verified the accuracy of this note documented by Nikki Herrera.     Guanako Luna MD  8/15/2021   [facility] EMERGENCY DEPARTMENT     Guanako Luna MD  08/15/21 6902

## 2021-10-23 ENCOUNTER — HEALTH MAINTENANCE LETTER (OUTPATIENT)
Age: 41
End: 2021-10-23

## 2022-02-12 ENCOUNTER — HEALTH MAINTENANCE LETTER (OUTPATIENT)
Age: 42
End: 2022-02-12

## 2022-10-09 ENCOUNTER — HEALTH MAINTENANCE LETTER (OUTPATIENT)
Age: 42
End: 2022-10-09

## 2023-02-18 ENCOUNTER — HEALTH MAINTENANCE LETTER (OUTPATIENT)
Age: 43
End: 2023-02-18

## 2024-03-16 ENCOUNTER — HEALTH MAINTENANCE LETTER (OUTPATIENT)
Age: 44
End: 2024-03-16

## 2025-06-17 DIAGNOSIS — Z82.79 FAMILY HISTORY OF BIRTH DEFECT: Primary | ICD-10-CM

## 2025-06-17 NOTE — PROGRESS NOTES
Porfirio will be participating as a family member in exome sequencing for his son, Alli ([id number]).  Please refer to their chart for additional details.     Verbal informed consent was obtained on 6/17/2025 after reviewing the risks, benefits, and limitations of participating in exome sequencing as a family member    Porfirio consented to receiving secondary findings for themself after reviewing information regarding them.    Vanesa Lopez MS [facility]  Genetic Counselor  Email: [email or web address]  Phone: [phone]  Pager: [phone]